# Patient Record
Sex: MALE | Race: BLACK OR AFRICAN AMERICAN | NOT HISPANIC OR LATINO | URBAN - METROPOLITAN AREA
[De-identification: names, ages, dates, MRNs, and addresses within clinical notes are randomized per-mention and may not be internally consistent; named-entity substitution may affect disease eponyms.]

---

## 2021-01-31 ENCOUNTER — EMERGENCY (EMERGENCY)
Facility: HOSPITAL | Age: 46
LOS: 1 days | Discharge: HOME | End: 2021-01-31
Attending: EMERGENCY MEDICINE | Admitting: EMERGENCY MEDICINE
Payer: OTHER MISCELLANEOUS

## 2021-01-31 VITALS
HEART RATE: 69 BPM | SYSTOLIC BLOOD PRESSURE: 121 MMHG | OXYGEN SATURATION: 100 % | RESPIRATION RATE: 18 BRPM | TEMPERATURE: 98 F | DIASTOLIC BLOOD PRESSURE: 75 MMHG

## 2021-01-31 VITALS
OXYGEN SATURATION: 100 % | HEART RATE: 69 BPM | RESPIRATION RATE: 18 BRPM | TEMPERATURE: 98 F | DIASTOLIC BLOOD PRESSURE: 78 MMHG | SYSTOLIC BLOOD PRESSURE: 143 MMHG

## 2021-01-31 PROCEDURE — 99284 EMERGENCY DEPT VISIT MOD MDM: CPT

## 2021-01-31 PROCEDURE — 72192 CT PELVIS W/O DYE: CPT | Mod: 26

## 2021-01-31 RX ORDER — OXYCODONE HYDROCHLORIDE 5 MG/1
1 TABLET ORAL
Qty: 12 | Refills: 0
Start: 2021-01-31 | End: 2021-02-02

## 2021-01-31 NOTE — ED ADULT TRIAGE NOTE - CHIEF COMPLAINT QUOTE
Arrives from urgent care, had work injury on tuesday where had LLQ hit by a piece of wood. referred here for persistent LLQ tender, pelvis and hip XR WNL, ambulates to triage without difficulty. No PMH

## 2021-01-31 NOTE — ED PROVIDER NOTE - CLINICAL SUMMARY MEDICAL DECISION MAKING FREE TEXT BOX
46 Y/O M denies PMH or PSH states he was cutting wood 2 days ago when the saw kicked back and hit his L hip. Pt states he went to urgent care and had normal X-rays. Plan is CT to R/O fx, pt had normal X-ray imaging at Urgent care.

## 2021-01-31 NOTE — ED PROVIDER NOTE - ATTENDING CONTRIBUTION TO CARE
Afebrile. Awake and Alert. Abdomen soft NTND. CN II-XII grossly intact. Moves all extremities without lateralization. +Focal TTP left ASIS hip.    CT pelvis r/o occult fracture  Supportive care for pain, pt declined pain medication in ED

## 2021-01-31 NOTE — ED PROVIDER NOTE - PATIENT PORTAL LINK FT
You can access the FollowMyHealth Patient Portal offered by [facility] by registering at the following website: [email or web address]. By joining [employer]’s FollowMyHealth portal, you will also be able to view your health information using other applications (apps) compatible with our system.

## 2021-01-31 NOTE — ED PROVIDER NOTE - OBJECTIVE STATEMENT
44 Y/O M denies PMH or PSH states he was cutting wood 2 days ago when the saw kicked back and hit his L hip. Pt states he went to urgent care and had normal X-rays. Pt states he is still having 8/10 pain when walking or moving the hip. Pt denies any other injuries or acute complaints. Pt declines pain medication at this time, states it is worse when he goes home.

## 2021-01-31 NOTE — ED PROVIDER NOTE - CRANIAL NERVE AND PUPILLARY EXAM
5/5 strength and intact sensation to light touch bilaterally in upper and lower extremity./cranial nerves 2-12 intact

## 2021-01-31 NOTE — ED PROVIDER NOTE - NSFOLLOWUPINSTRUCTIONS_ED_ALL_ED_FT
Follow up with an Orthopedist. They will call you for an appointment. If they do not call  to schedule an appointment.  Take Motrin and or Tylenol as needed for pain and take Oxycodone for persistent pain. Do not drive or drink alcohol after taking this medication, it can make you drowsy. Advance activity as tolerated.  Continue all previously prescribed medications as directed.  Follow up with your primary care physician in 48-72 hours- bring copies of your results.  Return to the ER for worsening or persistent symptoms, and/or ANY NEW OR CONCERNING SYMPTOMS. If you have issues obtaining follow up, please call: [phone] to obtain a doctor or specialist who takes your insurance in your area.  You may call [phone] to make an appointment with the internal medicine clinic.

## 2021-01-31 NOTE — ED PROVIDER NOTE - MUSCULOSKELETAL MINIMAL EXAM
Pain with ROM of the L hip. + William teerness L lateral hip, no crepitus, no abdominal tenderness or ecchymosis.

## 2021-02-01 RX ORDER — OXYCODONE HYDROCHLORIDE 5 MG/1
1 TABLET ORAL
Qty: 12 | Refills: 0
Start: 2021-02-01 | End: 2021-02-03

## 2021-02-01 NOTE — ED POST DISCHARGE NOTE - RESULT SUMMARY
Received call from pt who states that Rx was sent to wrong pharmacy.  Pt's preferred pharmacy entered into EMR.  Discussed issue with pt's ED provider RUCHI Dutta who will send Rx to new pharmacy.

## 2021-05-23 DIAGNOSIS — Z01.818 ENCOUNTER FOR OTHER PREPROCEDURAL EXAMINATION: ICD-10-CM

## 2021-05-23 PROBLEM — Z00.00 ENCOUNTER FOR PREVENTIVE HEALTH EXAMINATION: Status: ACTIVE | Noted: 2021-05-23

## 2021-05-24 ENCOUNTER — APPOINTMENT (OUTPATIENT)
Dept: DISASTER EMERGENCY | Facility: CLINIC | Age: 46
End: 2021-05-24

## 2021-05-25 LAB — SARS-COV-2 N GENE NPH QL NAA+PROBE: NOT DETECTED

## 2021-05-30 ENCOUNTER — EMERGENCY (EMERGENCY)
Facility: HOSPITAL | Age: 46
LOS: 1 days | Discharge: HOME | End: 2021-05-30
Attending: EMERGENCY MEDICINE | Admitting: EMERGENCY MEDICINE
Payer: SELF-PAY

## 2021-05-30 VITALS
TEMPERATURE: 98 F | OXYGEN SATURATION: 98 % | HEART RATE: 85 BPM | RESPIRATION RATE: 18 BRPM | DIASTOLIC BLOOD PRESSURE: 81 MMHG | SYSTOLIC BLOOD PRESSURE: 124 MMHG

## 2021-05-30 VITALS
DIASTOLIC BLOOD PRESSURE: 75 MMHG | HEART RATE: 58 BPM | RESPIRATION RATE: 14 BRPM | OXYGEN SATURATION: 100 % | SYSTOLIC BLOOD PRESSURE: 115 MMHG

## 2021-05-30 LAB
ALBUMIN SERPL ELPH-MCNC: 4.5 G/DL — SIGNIFICANT CHANGE UP (ref 3.3–5)
ALP SERPL-CCNC: 91 U/L — SIGNIFICANT CHANGE UP (ref 40–120)
ALT FLD-CCNC: 11 U/L — SIGNIFICANT CHANGE UP (ref 4–41)
ANION GAP SERPL CALC-SCNC: 14 MMOL/L — SIGNIFICANT CHANGE UP (ref 7–14)
AST SERPL-CCNC: 19 U/L — SIGNIFICANT CHANGE UP (ref 4–40)
BASOPHILS # BLD AUTO: 0.01 K/UL — SIGNIFICANT CHANGE UP (ref 0–0.2)
BASOPHILS NFR BLD AUTO: 0.1 % — SIGNIFICANT CHANGE UP (ref 0–2)
BILIRUB SERPL-MCNC: 0.5 MG/DL — SIGNIFICANT CHANGE UP (ref 0.2–1.2)
BUN SERPL-MCNC: 13 MG/DL — SIGNIFICANT CHANGE UP (ref 7–23)
CALCIUM SERPL-MCNC: 10 MG/DL — SIGNIFICANT CHANGE UP (ref 8.4–10.5)
CHLORIDE SERPL-SCNC: 104 MMOL/L — SIGNIFICANT CHANGE UP (ref 98–107)
CO2 SERPL-SCNC: 23 MMOL/L — SIGNIFICANT CHANGE UP (ref 22–31)
CREAT SERPL-MCNC: 1.02 MG/DL — SIGNIFICANT CHANGE UP (ref 0.5–1.3)
EOSINOPHIL # BLD AUTO: 0.04 K/UL — SIGNIFICANT CHANGE UP (ref 0–0.5)
EOSINOPHIL NFR BLD AUTO: 0.4 % — SIGNIFICANT CHANGE UP (ref 0–6)
GLUCOSE SERPL-MCNC: 95 MG/DL — SIGNIFICANT CHANGE UP (ref 70–99)
HCT VFR BLD CALC: 41.3 % — SIGNIFICANT CHANGE UP (ref 39–50)
HGB BLD-MCNC: 13.9 G/DL — SIGNIFICANT CHANGE UP (ref 13–17)
IANC: 5.93 K/UL — SIGNIFICANT CHANGE UP (ref 1.5–8.5)
IMM GRANULOCYTES NFR BLD AUTO: 0.2 % — SIGNIFICANT CHANGE UP (ref 0–1.5)
LYMPHOCYTES # BLD AUTO: 2.71 K/UL — SIGNIFICANT CHANGE UP (ref 1–3.3)
LYMPHOCYTES # BLD AUTO: 27.9 % — SIGNIFICANT CHANGE UP (ref 13–44)
MCHC RBC-ENTMCNC: 30.3 PG — SIGNIFICANT CHANGE UP (ref 27–34)
MCHC RBC-ENTMCNC: 33.7 GM/DL — SIGNIFICANT CHANGE UP (ref 32–36)
MCV RBC AUTO: 90.2 FL — SIGNIFICANT CHANGE UP (ref 80–100)
MONOCYTES # BLD AUTO: 0.99 K/UL — HIGH (ref 0–0.9)
MONOCYTES NFR BLD AUTO: 10.2 % — SIGNIFICANT CHANGE UP (ref 2–14)
NEUTROPHILS # BLD AUTO: 5.93 K/UL — SIGNIFICANT CHANGE UP (ref 1.8–7.4)
NEUTROPHILS NFR BLD AUTO: 61.2 % — SIGNIFICANT CHANGE UP (ref 43–77)
NRBC # BLD: 0 /100 WBCS — SIGNIFICANT CHANGE UP
NRBC # FLD: 0 K/UL — SIGNIFICANT CHANGE UP
PLATELET # BLD AUTO: 318 K/UL — SIGNIFICANT CHANGE UP (ref 150–400)
POTASSIUM SERPL-MCNC: 3.8 MMOL/L — SIGNIFICANT CHANGE UP (ref 3.5–5.3)
POTASSIUM SERPL-SCNC: 3.8 MMOL/L — SIGNIFICANT CHANGE UP (ref 3.5–5.3)
PROT SERPL-MCNC: 7.4 G/DL — SIGNIFICANT CHANGE UP (ref 6–8.3)
RBC # BLD: 4.58 M/UL — SIGNIFICANT CHANGE UP (ref 4.2–5.8)
RBC # FLD: 14.6 % — HIGH (ref 10.3–14.5)
SODIUM SERPL-SCNC: 141 MMOL/L — SIGNIFICANT CHANGE UP (ref 135–145)
TROPONIN T, HIGH SENSITIVITY RESULT: <6 NG/L — SIGNIFICANT CHANGE UP
WBC # BLD: 9.7 K/UL — SIGNIFICANT CHANGE UP (ref 3.8–10.5)
WBC # FLD AUTO: 9.7 K/UL — SIGNIFICANT CHANGE UP (ref 3.8–10.5)

## 2021-05-30 PROCEDURE — 99284 EMERGENCY DEPT VISIT MOD MDM: CPT

## 2021-05-30 PROCEDURE — 71046 X-RAY EXAM CHEST 2 VIEWS: CPT | Mod: 26

## 2021-05-30 RX ORDER — KETOROLAC TROMETHAMINE 30 MG/ML
30 SYRINGE (ML) INJECTION ONCE
Refills: 0 | Status: DISCONTINUED | OUTPATIENT
Start: 2021-05-30 | End: 2021-05-30

## 2021-05-30 RX ADMIN — Medication 30 MILLIGRAM(S): at 11:47

## 2021-05-30 RX ADMIN — Medication 30 MILLIGRAM(S): at 12:17

## 2021-05-30 NOTE — ED PROVIDER NOTE - ATTENDING CONTRIBUTION TO CARE
agree with above hpi  on my exam  vital signs: T(C): 36.7 (05-30-21 @ 11:13), Max: 36.7 (05-30-21 @ 11:13)  HR: 65 (05-30-21 @ 11:35) (65 - 85)  BP: 113/74 (05-30-21 @ 11:35) (113/74 - 124/81)  BP(mean): --  RR: 12 (05-30-21 @ 11:35) (12 - 18)  SpO2: 100% (05-30-21 @ 11:35) (98% - 100%)  GEN - NAD; well appearing; A+O x3   HEAD - NC/AT   EYES- PERRL, EOMI  ENT: Airway patent, mmm, Oral cavity and pharynx normal. No inflammation, swelling, exudate, or lesions.  NECK: Neck supple, non-tender without lymphadenopathy, no masses.  PULMONARY - CTA b/l, symmetric breath sounds.   CARDIAC -s1s2, RRR, no M,G,R, +left chest wall ttp, no crepitus, no defomity, no skin changes  ABDOMEN - +BS, ND, NT, soft, no guarding, no rebound, no masses   BACK - no CVA tenderness, Normal  spine   EXTREMITIES - FROM, symmetric pulses, capillary refill < 2 seconds, no edema   SKIN - no rash or bruising   NEUROLOGIC - alert, speech clear, no focal deficits  PSYCH -nl mood/affect, nl insight.  Patient presents to ed with left side cp radiating to left axilla intermittent since yesterday, lasts hours at a time, worse with palpation and movement, currently has it at 7/10, has not yet tried meds to relieve it. no associated fevers, cough, sweating, nausea, vomiting, sob, abd pain, vomiting, diarrhea, le edema, recent travel. No fhx of cardiac/thromboembolic dx. No regular meds. Had recent epidural injection for previous work injury. No numbness/weakness. no melena or brbpr. On exam appears well, vss, breathing unlabored, nl cp exam, +tenderness across left pectoral regions, no deformities/crepitus/skin changes. Plan for labs, cxr, ekg, pain control-eval for anemia, elec disturbance, organ dysfunction, acs though very low suspicion, more likely superficial msk based on h/p, will trial nsaid for pain control, if w/u negative given low risk will rec f/u with cardiology for further eval. Wells low perc negative low heart.

## 2021-05-30 NOTE — ED ADULT NURSE NOTE - OBJECTIVE STATEMENT
Pt presents to ED with left sided chest pain radiating to the left lateral chest and arm since this AM. Pt states he got an epidural injection in his back on Friday for back pain from a pain management doctor and called him today and was told to come to the ED. Pt AxOx3, ambulatory. Pain is reproducible and feels better when patient is laying down. Pt states the pain is intermittent but when it comes it stays for a while. Pt NSR on the monitor. Pt describes chest pain as sharp in nature. Denies shortness of breath, breathing even and unlabored. Denies abd pain, n/v/d. Pt denies dysuria and hematuria. Skin clean dry and intact. 20g IVL placed in the R AC. Will continue to monitor.

## 2021-05-30 NOTE — ED PROVIDER NOTE - PROGRESS NOTE DETAILS
RUCHI Thurman - patient reassessed - feeling better after toradol and asymptomatic. labs wnl trop x 1 negative. cxr negative. stable for DC with outpt. cards follow up.

## 2021-05-30 NOTE — ED PROVIDER NOTE - CLINICAL SUMMARY MEDICAL DECISION MAKING FREE TEXT BOX
44 y/o male pmh c/o chest pain x 2 days   -reproducible pain - likely msk  r/o acs  -labs trop cxr ekg  -toradol  -reassess

## 2021-05-30 NOTE — ED PROVIDER NOTE - PATIENT PORTAL LINK FT
You can access the FollowMyHealth Patient Portal offered by [facility] by registering at the following website: [email or web address]. By joining [employer]’s FollowMyHealth portal, you will also be able to view your health information using other applications (apps) compatible with our system.

## 2021-05-30 NOTE — ED PROVIDER NOTE - NSFOLLOWUPINSTRUCTIONS_ED_ALL_ED_FT
REST, NO STRENUOUS ACTIVITY  TAKE TYLENOL OR MOTRIN FOR PAIN  FOLLOW UP WITH CARDIOLOGIST FOR FURTHER EVALUATION  A TEAM MEMBER WILL REACH OUT TO HELP SET UP APPOINTMENT  RETURN TO ER FOR WORSENING SYMPTOMS

## 2021-05-30 NOTE — ED PROVIDER NOTE - OBJECTIVE STATEMENT
44 y/o male no PMH presents to ER c/o chest pain x 2 days. Pt. states that yesterday monirng developed chest pain that lasted roughly 4 hours and resolved. states this am at 7am developed simialr pain which has still persisted - describes 9/10 left sided chest pain which radiates into his left axilla - states pain is worse when he touches or presses on area and better when he lies flat. Pt. states 3 days ago had lumbar epidural for chronic back pain. Denies fever chills nausea vomit weakness dizziness loc sob cough uri symptoms. 46 y/o male no PMH presents to ER c/o chest pain x 2 days. Pt. states that yesterday developed chest pain that lasted roughly 4 hours and resolved. states this am at 7am developed similar pain which has still persisted - describes 9/10 left sided chest pain which radiates into his left axilla - states pain is worse when he touches or presses on area and better when he lies flat. Pt. states 3 days ago had lumbar epidural for chronic back pain. Denies fever chills nausea vomit weakness dizziness loc sob cough uri symptoms.

## 2021-05-30 NOTE — ED ADULT NURSE NOTE - NSIMPLEMENTINTERV_GEN_ALL_ED
Implemented All Universal Safety Interventions:  [place] to call system. Call bell, personal items and telephone within reach. Instruct patient to call for assistance. Room bathroom lighting operational. Non-slip footwear when patient is off stretcher. Physically safe environment: no spills, clutter or unnecessary equipment. Stretcher in lowest position, wheels locked, appropriate side rails in place.

## 2023-08-01 NOTE — ED PROVIDER NOTE - CPE EDP MUSC NORM
[FreeTextEntry1] : IMPRESSION: MRI BRAIN 7/5/23  En plaque left petroclinoid/tentorial leaflet meningioma approaching the left cavernous sinus. No surrounding vasogenic edema.    PLAN: 1. No surgical intervention is recommended. 2. MRI brain w/wo in July 2025. 3. F/U after imaging.
- - -

## 2025-04-13 NOTE — ED PROVIDER NOTE - DISPOSITION TYPE
Per [facility], patient was picked up from [address] [place] [address]. [facility] cannot bring power chair here due to chair not being able to fit in the ambulance.    DISCHARGE